# Patient Record
Sex: MALE | Race: WHITE | URBAN - METROPOLITAN AREA
[De-identification: names, ages, dates, MRNs, and addresses within clinical notes are randomized per-mention and may not be internally consistent; named-entity substitution may affect disease eponyms.]

---

## 2023-01-30 ENCOUNTER — TELEPHONE (OUTPATIENT)
Dept: SLEEP MEDICINE | Facility: HOSPITAL | Age: 33
End: 2023-01-30

## 2023-02-08 NOTE — TELEPHONE ENCOUNTER
PATIENT CALLED BACK AGAIN ASKING ABOUT REFERRAL - I SPOKE WITH [facility] AND THEY STATED THEY WOULD SEND THE ORDER.     WE RECEIVED THE ORDER AND IT'S SUPPOSED TO GO TO DR. KNAPP. FORWARDED THE FAX TO HER OFFICE.